# Patient Record
(demographics unavailable — no encounter records)

---

## 2024-10-16 NOTE — DATA REVIEWED
[MRI] : MRI [Right] : of the right [Ankle] : ankle [Report was reviewed and noted in the chart] : The report was reviewed and noted in the chart [I independently reviewed and interpreted images and report] : I independently reviewed and interpreted images and report [I reviewed the films/CD and agree] : I reviewed the films/CD and agree [FreeTextEntry1] : Focal bone spurs arising from the dorsal lateral calcaneocuboid articulation over an area measuring 8-9 mm. Mild cysts in the inferior body of the talus associating with sinus tarsi synovitis

## 2024-10-16 NOTE — DISCUSSION/SUMMARY
[de-identified] : I reviewed patient's right ankle MRI and discussed her condition and treatment options with patient and her father.  Given continued pain and prominence with shoe wear, I advised surgical excision.  I discussed details of surgery and recovery.  Patient and her father voiced understanding and agreement with the plan - they will discuss with mother prior to scheduling surgery.

## 2024-10-16 NOTE — HISTORY OF PRESENT ILLNESS
[de-identified] : Patient presents for RT ankle MRI results.  Since last visit, she reports continued pain and inability to run because of the bump on top of her foot.

## 2024-10-16 NOTE — PHYSICAL EXAM
[5___] : plantar flexion 5[unfilled]/5 [2+] : posterior tibialis pulse: 2+ [Right] : right ankle [There are no fractures, subluxations or dislocations. No significant abnormalities are seen] : There are no fractures, subluxations or dislocations. No significant abnormalities are seen [FreeTextEntry9] : prominence on lateral talus concerning for exostosis [] : no calf tenderness [FreeTextEntry8] : TTP along lateral mass

## 2024-11-08 NOTE — PHYSICAL EXAM
[Right] : right foot and ankle [5___] : plantar flexion 5[unfilled]/5 [2+] : posterior tibialis pulse: 2+ [] : no calf tenderness [FreeTextEntry8] : TTP along lateral mass

## 2024-11-08 NOTE — DISCUSSION/SUMMARY
[de-identified] : I reviewed patient's intraoperative radiographs and specimen and discussed her condition and treatment course with patient and her father.  I enforced the wound with steristrips.  Encouraged her to ice the foot twice a day.  Gradually return to activities.  Follow up in 4 weeks.  Patient voiced understanding and agreement with the plan.

## 2024-11-08 NOTE — HISTORY OF PRESENT ILLNESS
[de-identified] : Patient is s/p RT bone spur excision. Patient states that she is having pain but hasn't been taking anything for it. Patient denies fever/chills/ or swelling around the incision.  Has been walking in regular shoes.
Dr. [facility] with Level 1 nipple

## 2024-12-11 NOTE — DISCUSSION/SUMMARY
[de-identified] : I discussed her condition and treatment course with patient and her father.  Resume activities as tolerated.  Follow up as needed.  Patient voiced understanding and agreement with the plan.

## 2024-12-11 NOTE — HISTORY OF PRESENT ILLNESS
[de-identified] : Patient is following up s/p RT ankle spur removal. Patient states that she has been back to sports and activities. Patient takes Advil and ices when she is playing in basketball games.

## 2024-12-11 NOTE — PHYSICAL EXAM
[Right] : right foot and ankle [5___] : plantar flexion 5[unfilled]/5 [2+] : posterior tibialis pulse: 2+ [] : no calf tenderness [FreeTextEntry3] : retained suture removed from proximal pole of incision [FreeTextEntry8] : TTP along lateral mass

## 2025-01-02 NOTE — PLAN
[FreeTextEntry1] : 18-year-old female with history of abnormal menses presents as new patient looking to start hormonal contraception. Labs today to r/o underlying causes of AUB and recommend pelvic US.  Discussed importance of condom use if/when she resumes sexual activity irrespective of hormonal contraception. Given she was recently sexually active STI testing offered and Cassandra consents. She did complete Gardasil series.  Different contraception options reviewed at length, Cassandra notes phobia with needles and therefore does not want to trial Depo, does not believe she would be consistent with OCP. She believes she would be best suited with an IUD, brochures provided. To complete labs and US and return for IUD placement.

## 2025-01-02 NOTE — HISTORY OF PRESENT ILLNESS
[HIV Test offered] : HIV Test offered [Syphilis test offered] : Syphilis test offered [Gonorrhea test offered] : Gonorrhea test offered [Chlamydia test offered] : Chlamydia test offered [Trichomonas test offered] : Trichomonas test offered [HPV test offered] : HPV test offered [Hepatitis B test offered] : Hepatitis B test offered [Hepatitis C test offered] : Hepatitis C test offered [Y] : Patient uses contraception [Condoms] : uses condoms [TextBox_4] : 18-year-old female presents as new patient accompanied by her mother to discuss birth control options. Cassandra notes she was sexually active recently, utilized a condom but would like to consider hormonal contraception. She notes first menses occurred at age 11 and menses occur every other month. She notes pain during her cycle which typically last about 5 days. Notes acne but no hirsutism.  [LMPDate] : 11/9/24 [PGHxTotal] : 0

## 2025-01-21 NOTE — PROCEDURE
[IUD Placement] : intrauterine device (IUD) placement [Time out performed] : Pre-procedure time out performed.  Patient's name, date of birth and procedure confirmed. [Consent Obtained] : Consent obtained [Prevention of Pregnancy] : prevention of pregnancy [Risks] : risks [Benefits] : benefits [Alternatives] : alternatives [Patient] : patient [Infection] : infection [Bleeding] : bleeding [Pain] : pain [Expulsion] : expulsion [Failure] : failure [Uterine Perforation] : uterine perforation [Neg Pregnancy Test] : negative pregnancy test [Ibuprofen ___ mg] : ibuprofen [unfilled] ~Umg [Betadine] : Betadine [Tenaculum] : Tenaculum [de-identified] : Uterus could not be sounded due to resistance of os and extreme patient discomfort

## 2025-01-21 NOTE — ASSESSMENT
[FreeTextEntry1] : Patient presented for Kyleen a insertion accompanied by mother.  She appeared very anxious about procedure.  Has never had a gyn exam.   Patient had pain and was crying with insertion of speculum but instructed me to continue.  She had difficulty keeping her pelvis on the table.  I instructed her in relaxation breathing which she was not able to sustain After use of Hurricane spray patient rested for 5 minutes and I then began procedure with patient's consent.  No pain with application of tenaculum but pain and crying at attempt to sound uterus Procedure was discontinued due to intolerance of pain We discussed other contraceptive options and she displayed possible interest in Nexplanon.  Brochure given to read over and may book this procedure at her concvenience  She was also given the option of referral to Dr. Mac for second attampt at Kyleena insertion  Patient verbalizes understanding of and agreement with this plan.  All questions answered to patient's satisfaction.

## 2025-03-12 NOTE — HISTORY OF PRESENT ILLNESS
[FreeTextEntry1] : This visit was provided via telehealth using 2-way audio and visual technology. The patient, CASSANDRA DOMÍNGUEZ and provider, Bubba Stewart, were both located in [facility] and both participated in the telehealth encounter. Verbal consent 03/12/2025  by, patient, CASSANDRA DOMÍNGUEZ. Pt location: Home, [place], [place] Provider location: office, [address] [place] [address]  19 yo s/p attempted IUD Insertion presenting for counseling. Desires Kyleena IUD but had a negative experience with insertion. She reports being nervous and per note, stenotic os.  All: NKDA Meds: denies Obgynhx: denies pMh/PSH: surgery for bone spurs  SH: denies x3  Cassandra was offered the following: Misoprostol pre IUD for cervical ripening- has been shown to assist in inserting IUDs in cases of previous failure Valium for relaxation Local lidocaine injection for pain 800mg ibuprofen 30 minutes prior for pain   LNG-IUS insertion Counseling  The patient was counseled on the risks, benefits and alternatives to the LNG-IUS.  The patient understand the risks of infection, bleeding and trauma (specifically to the vagina, cervix, uterus, ovaries, fallopian tubes, bowel, bladder) and the small chance of IUD embedment, incorrect placement, incomplete removal and uterine perforation.  They voiced understanding that if uterine perforation, embedment or incomplete removal occurs that laparoscopy or hysteroscopy might be necessary to remove the IUD, and may or may not be successful in IUD removal.   The patient was counseled on potential side effects including changes in bleeding, specifically irregular bleeding in the first 3-6 months followed by increased likelihood of amenorrhea.  They were counseled on the risk of pregnancy, and of ectopic pregnancy (and the need to see a physician immediately if she experiences signs/symptoms of pregnancy), and the risk of expulsion.  Reviewed immediate efficacy for contraception. Recommend nothing per vagina x 1 day. They understand the need to follow up for a string check if any problems.  All questions were answered.

## 2025-03-12 NOTE — PLAN
[FreeTextEntry1] : follow up for iud insertion pt also offered insertion under anesthesia opting to try office first

## 2025-04-23 NOTE — PROCEDURE
[IUD Placement] : intrauterine device (IUD) placement [Time out performed] : Pre-procedure time out performed.  Patient's name, date of birth and procedure confirmed. [Consent Obtained] : Consent obtained [Risks] : risks [Benefits] : benefits [Alternatives] : alternatives [Patient] : patient [Infection] : infection [Bleeding] : bleeding [Pain] : pain [Expulsion] : expulsion [Failure] : failure [Uterine Perforation] : uterine perforation [Neg Pregnancy Test] : negative pregnancy test [Diazepam ___ mg] : diazepam [unfilled] ~Umg [Betadine] : Betadine [Tenaculum] : Tenaculum [Easy Passage] : Easy passage [Post Placement Transvag. US] : post placement transvaginal ultrasound [Kyleena IUD] : Kyleena IUD [US Guidance] : US Guidance [Tolerated Well] : Patient tolerated the procedure well [No Complications] : No complications [None] : None [de-identified] : Misoprostol 400mcg PV, naproxen 500mg PO, 10cc 1% lidocaine PCB [de-identified] : [id number] [de-identified] : 2/2027 [de-identified] : 4/23/30

## 2025-04-23 NOTE — ASSESSMENT
[FreeTextEntry1] : - effective after 7 days - nothing per vagina x 24 hours - string check yearly  - return prn - return to primary obgyn for routine care